# Patient Record
Sex: MALE | Race: WHITE | NOT HISPANIC OR LATINO
[De-identification: names, ages, dates, MRNs, and addresses within clinical notes are randomized per-mention and may not be internally consistent; named-entity substitution may affect disease eponyms.]

---

## 2018-04-04 ENCOUNTER — APPOINTMENT (OUTPATIENT)
Dept: UROLOGY | Facility: CLINIC | Age: 28
End: 2018-04-04
Payer: COMMERCIAL

## 2018-04-04 VITALS
SYSTOLIC BLOOD PRESSURE: 108 MMHG | TEMPERATURE: 98.6 F | HEART RATE: 87 BPM | WEIGHT: 165 LBS | OXYGEN SATURATION: 96 % | DIASTOLIC BLOOD PRESSURE: 71 MMHG | HEIGHT: 72 IN | BODY MASS INDEX: 22.35 KG/M2

## 2018-04-04 DIAGNOSIS — Z80.52 FAMILY HISTORY OF MALIGNANT NEOPLASM OF BLADDER: ICD-10-CM

## 2018-04-04 DIAGNOSIS — Z87.442 PERSONAL HISTORY OF URINARY CALCULI: ICD-10-CM

## 2018-04-04 PROCEDURE — 99214 OFFICE O/P EST MOD 30 MIN: CPT

## 2018-04-05 LAB
APPEARANCE: CLEAR
BACTERIA: NEGATIVE
BILIRUBIN URINE: NEGATIVE
BLOOD URINE: ABNORMAL
COLOR: YELLOW
GLUCOSE QUALITATIVE U: NEGATIVE MG/DL
KETONES URINE: NEGATIVE
LEUKOCYTE ESTERASE URINE: NEGATIVE
MICROSCOPIC-UA: NORMAL
NITRITE URINE: NEGATIVE
PH URINE: 7
PROTEIN URINE: NEGATIVE MG/DL
RED BLOOD CELLS URINE: 15 /HPF
SPECIFIC GRAVITY URINE: 1.01
SQUAMOUS EPITHELIAL CELLS: 0 /HPF
UROBILINOGEN URINE: NEGATIVE MG/DL
WHITE BLOOD CELLS URINE: 0 /HPF

## 2018-04-09 LAB — BACTERIA UR CULT: NORMAL

## 2018-04-10 ENCOUNTER — FORM ENCOUNTER (OUTPATIENT)
Age: 28
End: 2018-04-10

## 2018-04-11 ENCOUNTER — OUTPATIENT (OUTPATIENT)
Dept: OUTPATIENT SERVICES | Facility: HOSPITAL | Age: 28
LOS: 1 days | End: 2018-04-11
Payer: COMMERCIAL

## 2018-04-11 ENCOUNTER — APPOINTMENT (OUTPATIENT)
Dept: CT IMAGING | Facility: CLINIC | Age: 28
End: 2018-04-11
Payer: COMMERCIAL

## 2018-04-11 DIAGNOSIS — N20.0 CALCULUS OF KIDNEY: ICD-10-CM

## 2018-04-11 DIAGNOSIS — S82.409A UNSPECIFIED FRACTURE OF SHAFT OF UNSPECIFIED FIBULA, INITIAL ENCOUNTER FOR CLOSED FRACTURE: Chronic | ICD-10-CM

## 2018-04-11 PROCEDURE — 74176 CT ABD & PELVIS W/O CONTRAST: CPT

## 2018-04-11 PROCEDURE — 74018 RADEX ABDOMEN 1 VIEW: CPT | Mod: 26

## 2018-04-11 PROCEDURE — 74018 RADEX ABDOMEN 1 VIEW: CPT

## 2018-04-11 PROCEDURE — 74176 CT ABD & PELVIS W/O CONTRAST: CPT | Mod: 26

## 2018-04-12 ENCOUNTER — APPOINTMENT (OUTPATIENT)
Dept: UROLOGY | Facility: CLINIC | Age: 28
End: 2018-04-12
Payer: COMMERCIAL

## 2018-04-12 PROCEDURE — 99214 OFFICE O/P EST MOD 30 MIN: CPT

## 2018-05-25 ENCOUNTER — APPOINTMENT (OUTPATIENT)
Dept: UROLOGY | Facility: CLINIC | Age: 28
End: 2018-05-25
Payer: COMMERCIAL

## 2018-05-25 VITALS
HEIGHT: 72 IN | BODY MASS INDEX: 22.35 KG/M2 | RESPIRATION RATE: 16 BRPM | DIASTOLIC BLOOD PRESSURE: 74 MMHG | OXYGEN SATURATION: 96 % | WEIGHT: 165 LBS | HEART RATE: 88 BPM | TEMPERATURE: 98.2 F | SYSTOLIC BLOOD PRESSURE: 110 MMHG

## 2018-05-25 PROCEDURE — 99213 OFFICE O/P EST LOW 20 MIN: CPT

## 2018-06-01 LAB — BACTERIA UR CULT: NORMAL

## 2018-06-18 ENCOUNTER — APPOINTMENT (OUTPATIENT)
Dept: UROLOGY | Facility: CLINIC | Age: 28
End: 2018-06-18

## 2018-06-21 ENCOUNTER — APPOINTMENT (OUTPATIENT)
Dept: UROLOGY | Facility: HOSPITAL | Age: 28
End: 2018-06-21

## 2018-07-30 ENCOUNTER — OUTPATIENT (OUTPATIENT)
Dept: OUTPATIENT SERVICES | Facility: HOSPITAL | Age: 28
LOS: 1 days | End: 2018-07-30
Payer: COMMERCIAL

## 2018-07-30 VITALS
TEMPERATURE: 99 F | SYSTOLIC BLOOD PRESSURE: 118 MMHG | RESPIRATION RATE: 15 BRPM | OXYGEN SATURATION: 98 % | HEART RATE: 69 BPM | DIASTOLIC BLOOD PRESSURE: 77 MMHG | WEIGHT: 161.38 LBS | HEIGHT: 72 IN

## 2018-07-30 DIAGNOSIS — N20.0 CALCULUS OF KIDNEY: ICD-10-CM

## 2018-07-30 DIAGNOSIS — Z98.890 OTHER SPECIFIED POSTPROCEDURAL STATES: Chronic | ICD-10-CM

## 2018-07-30 DIAGNOSIS — S82.409A UNSPECIFIED FRACTURE OF SHAFT OF UNSPECIFIED FIBULA, INITIAL ENCOUNTER FOR CLOSED FRACTURE: Chronic | ICD-10-CM

## 2018-07-30 DIAGNOSIS — Z01.818 ENCOUNTER FOR OTHER PREPROCEDURAL EXAMINATION: ICD-10-CM

## 2018-07-30 LAB
ANION GAP SERPL CALC-SCNC: 11 MMOL/L — SIGNIFICANT CHANGE UP (ref 5–17)
BUN SERPL-MCNC: 14 MG/DL — SIGNIFICANT CHANGE UP (ref 7–23)
CALCIUM SERPL-MCNC: 9.2 MG/DL — SIGNIFICANT CHANGE UP (ref 8.4–10.5)
CHLORIDE SERPL-SCNC: 101 MMOL/L — SIGNIFICANT CHANGE UP (ref 96–108)
CO2 SERPL-SCNC: 27 MMOL/L — SIGNIFICANT CHANGE UP (ref 22–31)
CREAT SERPL-MCNC: 1.14 MG/DL — SIGNIFICANT CHANGE UP (ref 0.5–1.3)
GLUCOSE SERPL-MCNC: 90 MG/DL — SIGNIFICANT CHANGE UP (ref 70–99)
POTASSIUM SERPL-MCNC: 4.1 MMOL/L — SIGNIFICANT CHANGE UP (ref 3.5–5.3)
POTASSIUM SERPL-SCNC: 4.1 MMOL/L — SIGNIFICANT CHANGE UP (ref 3.5–5.3)
SODIUM SERPL-SCNC: 139 MMOL/L — SIGNIFICANT CHANGE UP (ref 135–145)

## 2018-07-30 PROCEDURE — G0463: CPT

## 2018-07-30 PROCEDURE — 85027 COMPLETE CBC AUTOMATED: CPT

## 2018-07-30 PROCEDURE — 87086 URINE CULTURE/COLONY COUNT: CPT

## 2018-07-30 PROCEDURE — 80048 BASIC METABOLIC PNL TOTAL CA: CPT

## 2018-07-30 RX ORDER — SODIUM CHLORIDE 9 MG/ML
3 INJECTION INTRAMUSCULAR; INTRAVENOUS; SUBCUTANEOUS EVERY 8 HOURS
Qty: 0 | Refills: 0 | Status: DISCONTINUED | OUTPATIENT
Start: 2018-08-03 | End: 2018-08-03

## 2018-07-30 RX ORDER — CIPROFLOXACIN LACTATE 400MG/40ML
400 VIAL (ML) INTRAVENOUS ONCE
Qty: 0 | Refills: 0 | Status: DISCONTINUED | OUTPATIENT
Start: 2018-08-03 | End: 2018-08-18

## 2018-07-30 RX ORDER — LIDOCAINE HCL 20 MG/ML
0.2 VIAL (ML) INJECTION ONCE
Qty: 0 | Refills: 0 | Status: DISCONTINUED | OUTPATIENT
Start: 2018-08-03 | End: 2018-08-03

## 2018-07-30 NOTE — H&P PST ADULT - NSANTHOSAYNRD_GEN_A_CORE
No. AUDREY screening performed.  STOP BANG Legend: 0-2 = LOW Risk; 3-4 = INTERMEDIATE Risk; 5-8 = HIGH Risk

## 2018-07-30 NOTE — H&P PST ADULT - HISTORY OF PRESENT ILLNESS
24 yr old male with trace blood in urinalysis in routine physical- started having left flank pain a week later- kidney stone. Now here for Left ESWL on 3/6/15. 26 yo male with h/o childhood asthma (no current medications), past h/o renal calculus s/p ESWL 3/2015 with acute onset of intermittent left flank pain. Imaging revealed 9x 5 mm non obstructing stone in left kidney lower pole. Pt is scheduled for left ESWL on 8/3/18. 26 yo male with h/o childhood asthma (no current medications), past h/o renal calculus s/p left ESWL 3/2015 with acute onset of intermittent left flank pain. Imaging revealed 9x 5 mm non obstructing stone in left kidney lower pole. Pt is scheduled for left ESWL on 8/3/18.

## 2018-07-30 NOTE — H&P PST ADULT - VISION (WITH CORRECTIVE LENSES IF THE PATIENT USUALLY WEARS THEM):
Partially impaired: cannot see medication labels or newsprint, but can see obstacles in path, and the surrounding layout; can count fingers at arm's length/glasses/contacts

## 2018-07-30 NOTE — H&P PST ADULT - PSH
Closed fracture of fibula  s/p ORIF- LEFT Closed fracture of fibula  s/p ORIF- LEFT  H/O lithotripsy  ESWL 3/2015 Closed fracture of fibula  s/p ORIF- LEFT  H/O lithotripsy  Left ESWL 3/2015

## 2018-07-31 LAB
CULTURE RESULTS: NO GROWTH — SIGNIFICANT CHANGE UP
HCT VFR BLD CALC: 43 % — SIGNIFICANT CHANGE UP (ref 39–50)
HGB BLD-MCNC: 14.4 G/DL — SIGNIFICANT CHANGE UP (ref 13–17)
MCHC RBC-ENTMCNC: 29.1 PG — SIGNIFICANT CHANGE UP (ref 27–34)
MCHC RBC-ENTMCNC: 33.5 GM/DL — SIGNIFICANT CHANGE UP (ref 32–36)
MCV RBC AUTO: 87 FL — SIGNIFICANT CHANGE UP (ref 80–100)
PLATELET # BLD AUTO: 309 K/UL — SIGNIFICANT CHANGE UP (ref 150–400)
RBC # BLD: 4.94 M/UL — SIGNIFICANT CHANGE UP (ref 4.2–5.8)
RBC # FLD: 13.1 % — SIGNIFICANT CHANGE UP (ref 10.3–14.5)
SPECIMEN SOURCE: SIGNIFICANT CHANGE UP
WBC # BLD: 7.35 K/UL — SIGNIFICANT CHANGE UP (ref 3.8–10.5)
WBC # FLD AUTO: 7.35 K/UL — SIGNIFICANT CHANGE UP (ref 3.8–10.5)

## 2018-08-02 ENCOUNTER — FORM ENCOUNTER (OUTPATIENT)
Age: 28
End: 2018-08-02

## 2018-08-03 ENCOUNTER — APPOINTMENT (OUTPATIENT)
Dept: UROLOGY | Facility: HOSPITAL | Age: 28
End: 2018-08-03

## 2018-08-03 ENCOUNTER — OUTPATIENT (OUTPATIENT)
Dept: OUTPATIENT SERVICES | Facility: HOSPITAL | Age: 28
LOS: 1 days | End: 2018-08-03
Payer: COMMERCIAL

## 2018-08-03 VITALS
OXYGEN SATURATION: 100 % | HEART RATE: 77 BPM | DIASTOLIC BLOOD PRESSURE: 66 MMHG | SYSTOLIC BLOOD PRESSURE: 110 MMHG | RESPIRATION RATE: 16 BRPM | TEMPERATURE: 98 F

## 2018-08-03 VITALS
HEART RATE: 72 BPM | HEIGHT: 72 IN | RESPIRATION RATE: 20 BRPM | DIASTOLIC BLOOD PRESSURE: 77 MMHG | OXYGEN SATURATION: 98 % | SYSTOLIC BLOOD PRESSURE: 113 MMHG | WEIGHT: 161.38 LBS | TEMPERATURE: 98 F

## 2018-08-03 DIAGNOSIS — N20.0 CALCULUS OF KIDNEY: ICD-10-CM

## 2018-08-03 DIAGNOSIS — Z98.890 OTHER SPECIFIED POSTPROCEDURAL STATES: Chronic | ICD-10-CM

## 2018-08-03 DIAGNOSIS — S82.409A UNSPECIFIED FRACTURE OF SHAFT OF UNSPECIFIED FIBULA, INITIAL ENCOUNTER FOR CLOSED FRACTURE: Chronic | ICD-10-CM

## 2018-08-03 PROCEDURE — 74018 RADEX ABDOMEN 1 VIEW: CPT | Mod: 26

## 2018-08-03 PROCEDURE — 76000 FLUOROSCOPY <1 HR PHYS/QHP: CPT

## 2018-08-03 PROCEDURE — 50590 FRAGMENTING OF KIDNEY STONE: CPT | Mod: LT

## 2018-08-03 PROCEDURE — 50590 FRAGMENTING OF KIDNEY STONE: CPT

## 2018-08-03 PROCEDURE — 74018 RADEX ABDOMEN 1 VIEW: CPT

## 2018-08-03 RX ORDER — TAMSULOSIN HYDROCHLORIDE 0.4 MG/1
1 CAPSULE ORAL
Qty: 30 | Refills: 0 | OUTPATIENT
Start: 2018-08-03 | End: 2018-09-01

## 2018-08-03 RX ORDER — METOCLOPRAMIDE HCL 10 MG
10 TABLET ORAL ONCE
Qty: 0 | Refills: 0 | Status: DISCONTINUED | OUTPATIENT
Start: 2018-08-03 | End: 2018-08-03

## 2018-08-03 RX ORDER — ONDANSETRON 8 MG/1
4 TABLET, FILM COATED ORAL EVERY 4 HOURS
Qty: 0 | Refills: 0 | Status: DISCONTINUED | OUTPATIENT
Start: 2018-08-03 | End: 2018-08-03

## 2018-08-03 RX ORDER — SODIUM CHLORIDE 9 MG/ML
1000 INJECTION, SOLUTION INTRAVENOUS
Qty: 0 | Refills: 0 | Status: DISCONTINUED | OUTPATIENT
Start: 2018-08-03 | End: 2018-08-18

## 2018-08-03 RX ADMIN — SODIUM CHLORIDE 3 MILLILITER(S): 9 INJECTION INTRAMUSCULAR; INTRAVENOUS; SUBCUTANEOUS at 06:57

## 2018-08-03 NOTE — ASU DISCHARGE PLAN (ADULT/PEDIATRIC). - MEDICATION SUMMARY - MEDICATIONS TO TAKE
I will START or STAY ON the medications listed below when I get home from the hospital:    Percocet 5/325 oral tablet  -- 1 tab(s) by mouth every 6 hours, As Needed -for moderate pain - for severe pain MDD:4 tabs   -- Caution federal law prohibits the transfer of this drug to any person other  than the person for whom it was prescribed.  May cause drowsiness.  Alcohol may intensify this effect.  Use care when operating dangerous machinery.  This prescription cannot be refilled.  This product contains acetaminophen.  Do not use  with any other product containing acetaminophen to prevent possible liver damage.  Using more of this medication than prescribed may cause serious breathing problems.    -- Indication: For pain    Flomax 0.4 mg oral capsule  -- 1 cap(s) by mouth once a day (at bedtime)  - pt currently not taking   -- Indication: For passage of stones

## 2018-08-03 NOTE — ASU DISCHARGE PLAN (ADULT/PEDIATRIC). - NOTIFY
Persistent Nausea and Vomiting/Unable to Urinate/Fever greater than 101/Inability to Tolerate Liquids or Foods

## 2018-08-03 NOTE — ASU DISCHARGE PLAN (ADULT/PEDIATRIC). - INSTRUCTIONS
Be sure to drink plenty of fluids.  Please strain your urine and brig stone fragments to your next visit. Please take Flomax daily to facilitate stone fragment passage

## 2018-08-03 NOTE — BRIEF OPERATIVE NOTE - PROCEDURE
<<-----Click on this checkbox to enter Procedure ESWL (extracorporeal shockwave lithotripsy)  08/03/2018    Active  [id number]

## 2018-08-04 ENCOUNTER — CLINICAL ADVICE (OUTPATIENT)
Age: 28
End: 2018-08-04

## 2018-08-06 ENCOUNTER — FORM ENCOUNTER (OUTPATIENT)
Age: 28
End: 2018-08-06

## 2018-08-07 ENCOUNTER — OUTPATIENT (OUTPATIENT)
Dept: OUTPATIENT SERVICES | Facility: HOSPITAL | Age: 28
LOS: 1 days | End: 2018-08-07
Payer: COMMERCIAL

## 2018-08-07 ENCOUNTER — APPOINTMENT (OUTPATIENT)
Dept: CT IMAGING | Facility: IMAGING CENTER | Age: 28
End: 2018-08-07
Payer: COMMERCIAL

## 2018-08-07 ENCOUNTER — FORM ENCOUNTER (OUTPATIENT)
Age: 28
End: 2018-08-07

## 2018-08-07 ENCOUNTER — APPOINTMENT (OUTPATIENT)
Dept: UROLOGY | Facility: CLINIC | Age: 28
End: 2018-08-07
Payer: COMMERCIAL

## 2018-08-07 DIAGNOSIS — Z98.890 OTHER SPECIFIED POSTPROCEDURAL STATES: Chronic | ICD-10-CM

## 2018-08-07 DIAGNOSIS — S82.409A UNSPECIFIED FRACTURE OF SHAFT OF UNSPECIFIED FIBULA, INITIAL ENCOUNTER FOR CLOSED FRACTURE: Chronic | ICD-10-CM

## 2018-08-07 DIAGNOSIS — N20.0 CALCULUS OF KIDNEY: ICD-10-CM

## 2018-08-07 PROBLEM — J45.909 UNSPECIFIED ASTHMA, UNCOMPLICATED: Chronic | Status: ACTIVE | Noted: 2018-07-30

## 2018-08-07 PROCEDURE — 74176 CT ABD & PELVIS W/O CONTRAST: CPT

## 2018-08-07 PROCEDURE — 99024 POSTOP FOLLOW-UP VISIT: CPT

## 2018-08-07 PROCEDURE — 74176 CT ABD & PELVIS W/O CONTRAST: CPT | Mod: 26

## 2018-08-08 ENCOUNTER — APPOINTMENT (OUTPATIENT)
Dept: UROLOGY | Facility: CLINIC | Age: 28
End: 2018-08-08
Payer: COMMERCIAL

## 2018-08-08 ENCOUNTER — OUTPATIENT (OUTPATIENT)
Dept: OUTPATIENT SERVICES | Facility: HOSPITAL | Age: 28
LOS: 1 days | End: 2018-08-08
Payer: COMMERCIAL

## 2018-08-08 ENCOUNTER — APPOINTMENT (OUTPATIENT)
Dept: RADIOLOGY | Facility: IMAGING CENTER | Age: 28
End: 2018-08-08
Payer: COMMERCIAL

## 2018-08-08 DIAGNOSIS — N20.0 CALCULUS OF KIDNEY: ICD-10-CM

## 2018-08-08 DIAGNOSIS — Z98.890 OTHER SPECIFIED POSTPROCEDURAL STATES: Chronic | ICD-10-CM

## 2018-08-08 DIAGNOSIS — S82.409A UNSPECIFIED FRACTURE OF SHAFT OF UNSPECIFIED FIBULA, INITIAL ENCOUNTER FOR CLOSED FRACTURE: Chronic | ICD-10-CM

## 2018-08-08 PROCEDURE — 74018 RADEX ABDOMEN 1 VIEW: CPT

## 2018-08-08 PROCEDURE — 99024 POSTOP FOLLOW-UP VISIT: CPT

## 2018-08-08 PROCEDURE — 74018 RADEX ABDOMEN 1 VIEW: CPT | Mod: 26

## 2018-08-09 LAB — BACTERIA UR CULT: NORMAL

## 2018-11-07 ENCOUNTER — APPOINTMENT (OUTPATIENT)
Dept: ULTRASOUND IMAGING | Facility: IMAGING CENTER | Age: 28
End: 2018-11-07

## 2018-11-09 ENCOUNTER — APPOINTMENT (OUTPATIENT)
Dept: UROLOGY | Facility: CLINIC | Age: 28
End: 2018-11-09

## 2019-12-26 ENCOUNTER — CLINICAL ADVICE (OUTPATIENT)
Age: 29
End: 2019-12-26

## 2022-03-30 ENCOUNTER — APPOINTMENT (OUTPATIENT)
Dept: ORTHOPEDIC SURGERY | Facility: CLINIC | Age: 32
End: 2022-03-30
Payer: COMMERCIAL

## 2022-03-30 ENCOUNTER — NON-APPOINTMENT (OUTPATIENT)
Age: 32
End: 2022-03-30

## 2022-03-30 DIAGNOSIS — M25.511 PAIN IN RIGHT SHOULDER: ICD-10-CM

## 2022-03-30 PROCEDURE — 73030 X-RAY EXAM OF SHOULDER: CPT | Mod: RT

## 2022-03-30 PROCEDURE — 99203 OFFICE O/P NEW LOW 30 MIN: CPT

## 2022-03-30 NOTE — DISCUSSION/SUMMARY
[de-identified] : 32 y/o male with right shoulder pain. \par \par Patient presents with acute condition to the right shoulder. Clinically, patient has positive posterior shoulder pain with [place]'s testing. I discussed that the most likely source of the discomfort is the glenoid labrum or bicipital complex and can be a result of traumatic injury and instability versus chronic degeneration from overhead activity and overuse. Labral injury can cause symptoms of instability and pain. \par \par We discussed short-term and long-term outcomes as well as the goal of treatment to reduce pain and restore function. Nonsurgical treatment is typically first-line therapy that may take weeks to months to resolve symptoms; includes rest from overhead activities, NSAIDs, home exercise program versus physical therapy to restore normal strength/ROM/function of the shoulder. We also discussed the role of arthroscopic surgical intervention when nonsurgical treatment does not adequately relieve pain/inflammation/instability.\par \par MRI Rx given to patient to further delineate any internal structural derrangement to the shoulder. This will allow for better understanding of the severity of disease, and allow for better stratification of treatment strategies (surgical vs. non-surgical). Patient is agreeable to further imaging.\par \par Recommendations: Conservative modalities including overhead activity rest/avoidance, ice, NSAIDs, strengthening and stretching program.\par \par Followup: After MRI.

## 2022-03-30 NOTE — HISTORY OF PRESENT ILLNESS
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 He was playing basketball and someone ran into him from behind and felt like the shoulder shifted. The pain is brought on with extreme FF, Push ups and laying on it. Localizes pain to the back of the shoulder. Denies instability. He is not taking pain medication. Denies numbness or tingling. Patient is active and has continued playing basketball and going to the gym.\par \par The patient's past medical history, past surgical history, medications and allergies were reviewed by me today with the patient and documented accordingly. In addition, the patient's family and social history, which were noncontributory to this visit, were reviewed also.

## 2022-03-30 NOTE — PHYSICAL EXAM
[de-identified] : Oriented to time, place, person\par Mood: Normal\par Affect: Normal\par Appearance: Healthy, well appearing, no acute distress.\par Gait: Normal\par Assistive Devices: None\par \par Right shoulder exam:\par \par Inspection: No malalignment, No defects, No atrophy\par Skin: No masses, No lesions\par Neck: Negative Spurling, full ROM, no pain with ROM\par AROM: FF to 180, abduction to 90, ER to 90, IR to upper lumbar\par Painful arc ROM: Mild pain with FF\par Tenderness: no bicipital tenderness, no tenderness to greater tuberosity/RTC insertion, no anterior shoulder/lesser tuberosity tenderness\par Strength: 5/5 ER, 5/5 IR in adduction, 5/5 supraspinatus testing, + [place]'s test with posterior shoulder pain. \par AC joint: No TTP/pain with cross arm testing\par Biceps: Speed Negative, Yergason Negative \par Impingement test: +Clement, + Neer\par Vasc: 2+ radial pulse \par Stability: Stable \par Neuro: AIN, PIN, Ulnar nerve intact to motor\par Sensation: Intact to light touch throughout  [de-identified] : The following radiographs were ordered and read by me during this patients visit. I reviewed each radiograph in detail with the patient and discussed the findings as highlighted below.\par \par 3 views of right shoulder were obtained today, 03/30/2022, that show no acute fracture or dislocation. There is no glenohumeral and no AC joint degenerative change seen. Type II acromion. There is no significant malalignment. No significant other obvious osseous abnormality, otherwise unremarkable.

## 2022-03-30 NOTE — ADDENDUM
[FreeTextEntry1] : This note was written by Consuelo Mendoza on 03/30/2022 acting solely as a scribe for Dr. Ricardo Sims.\par \par All medical record entries made by the Scribe were at my, Dr. Ricardo Sims, direction and personally dictated by me on 03/30/2022. I have personally reviewed the chart and agree that the record accurately reflects my personal performance of the history, physical exam, assessment and plan.

## 2022-04-12 PROBLEM — Z00.00 ENCOUNTER FOR PREVENTIVE HEALTH EXAMINATION: Status: ACTIVE | Noted: 2022-04-12

## 2022-04-16 ENCOUNTER — OUTPATIENT (OUTPATIENT)
Dept: OUTPATIENT SERVICES | Facility: HOSPITAL | Age: 32
LOS: 1 days | End: 2022-04-16
Payer: COMMERCIAL

## 2022-04-16 ENCOUNTER — APPOINTMENT (OUTPATIENT)
Dept: MRI IMAGING | Facility: CLINIC | Age: 32
End: 2022-04-16

## 2022-04-16 ENCOUNTER — RESULT REVIEW (OUTPATIENT)
Age: 32
End: 2022-04-16

## 2022-04-16 ENCOUNTER — APPOINTMENT (OUTPATIENT)
Dept: MRI IMAGING | Facility: CLINIC | Age: 32
End: 2022-04-16
Payer: COMMERCIAL

## 2022-04-16 DIAGNOSIS — M25.511 PAIN IN RIGHT SHOULDER: ICD-10-CM

## 2022-04-16 DIAGNOSIS — Z98.890 OTHER SPECIFIED POSTPROCEDURAL STATES: Chronic | ICD-10-CM

## 2022-04-16 DIAGNOSIS — S82.409A UNSPECIFIED FRACTURE OF SHAFT OF UNSPECIFIED FIBULA, INITIAL ENCOUNTER FOR CLOSED FRACTURE: Chronic | ICD-10-CM

## 2022-04-16 PROCEDURE — 73221 MRI JOINT UPR EXTREM W/O DYE: CPT

## 2022-04-16 PROCEDURE — 73221 MRI JOINT UPR EXTREM W/O DYE: CPT | Mod: 26,RT

## 2022-04-28 ENCOUNTER — NON-APPOINTMENT (OUTPATIENT)
Age: 32
End: 2022-04-28

## 2023-08-30 ENCOUNTER — APPOINTMENT (OUTPATIENT)
Dept: ORTHOPEDIC SURGERY | Facility: CLINIC | Age: 33
End: 2023-08-30
Payer: COMMERCIAL

## 2023-08-30 VITALS
BODY MASS INDEX: 23.3 KG/M2 | HEART RATE: 76 BPM | SYSTOLIC BLOOD PRESSURE: 115 MMHG | DIASTOLIC BLOOD PRESSURE: 72 MMHG | WEIGHT: 172 LBS | HEIGHT: 72 IN

## 2023-08-30 DIAGNOSIS — M25.561 PAIN IN RIGHT KNEE: ICD-10-CM

## 2023-08-30 PROCEDURE — 73562 X-RAY EXAM OF KNEE 3: CPT | Mod: RT

## 2023-08-30 PROCEDURE — 99214 OFFICE O/P EST MOD 30 MIN: CPT

## 2023-08-30 RX ORDER — NAPROXEN 500 MG/1
500 TABLET ORAL
Qty: 20 | Refills: 0 | Status: ACTIVE | COMMUNITY
Start: 2023-08-30 | End: 1900-01-01

## 2023-08-30 NOTE — HISTORY OF PRESENT ILLNESS
[de-identified] : 31 y/o M presents with 1 month of worsening R knee pain. Pt does not recall trauma or injury. States pain started off as achy, but has progressed in nature and intensity to a 6/10 while putting pressure on it. He complains of clicking with extension of knee. He complains of his knee feeling weak. Pt is not able to work-out lower extremity or run. Pt denies medications for pain.  Pt denies numbness or tingling.  The patient's past medical history, past surgical history, medications and allergies were reviewed by me today with the patient and documented accordingly. In addition, the patient's family and social history, which were noncontributory to this visit, were reviewed also.

## 2023-08-30 NOTE — DISCUSSION/SUMMARY
[de-identified] : 32-year-old male with right knee pain  Patient presents with right knee pain consistent with an overuse injury.  There is no significant internal derangement suspected on clinical evaluation, but there does appear to be some mild residual inflammation present.  Patient complains of some discomfort peripatellar, as well as some discomfort in high flexion consistent with overuse process.  Recommendations made for conservative management prior to consideration of imaging as there was no traumatic event.  Recommendation: Trial prescribed NSAIDs x7 to 10 days.  Ice/rest as indicated.  Home exercise program provided.  Follow-up if no improvement for possible consideration MRI.

## 2023-08-30 NOTE — PHYSICAL EXAM
[de-identified] : Right knee exam  Skin: Clean, dry, intact Inspection: No obvious malalignment, no masses, mild swelling, no effusion Pulses: 2+ DP/PT pulses ROM: 0-135 degrees of flexion.  Mild pain with deep knee flexion/extension. Tenderness: No MJLT. No LJLT. No pain over the patella facets. No pain to the quadriceps tendon. No pain to the patella tendon. No posterior knee tenderness. Stability: Stable to varus, valgus. Negative lachman testing. Negative anterior drawer, negative posterior drawer. Strength: 5/5 Q/H/TA/GS/EHL, without atrophy Neuro: In tact to light touch throughout, DTR's normal Additional tests: Negative McMurrays test, Negative patellar grind test.   [de-identified] :  The following radiographs were ordered and read by me during this patients visit. I reviewed each radiograph in detail with the patient and discussed the findings as highlighted below.   3 views of the right knee were obtained today that show no acute fracture or dislocation. There is no degenerative change seen. There is no significant malalignment. No significant other obvious osseous abnormality, otherwise unremarkable.

## 2023-10-30 ENCOUNTER — APPOINTMENT (OUTPATIENT)
Dept: UROLOGY | Facility: CLINIC | Age: 33
End: 2023-10-30
Payer: COMMERCIAL

## 2023-10-30 VITALS
SYSTOLIC BLOOD PRESSURE: 127 MMHG | HEIGHT: 72 IN | BODY MASS INDEX: 23.03 KG/M2 | WEIGHT: 170 LBS | TEMPERATURE: 97.2 F | DIASTOLIC BLOOD PRESSURE: 82 MMHG | RESPIRATION RATE: 16 BRPM | HEART RATE: 78 BPM

## 2023-10-30 PROCEDURE — 99203 OFFICE O/P NEW LOW 30 MIN: CPT

## 2023-10-30 RX ORDER — DOXYCYCLINE HYCLATE 100 MG/1
100 TABLET ORAL TWICE DAILY
Qty: 28 | Refills: 0 | Status: ACTIVE | COMMUNITY
Start: 2023-10-30 | End: 1900-01-01

## 2023-10-30 RX ORDER — TAMSULOSIN HYDROCHLORIDE 0.4 MG/1
0.4 CAPSULE ORAL
Qty: 30 | Refills: 2 | Status: COMPLETED | COMMUNITY
Start: 2018-04-12 | End: 2023-10-30

## 2023-10-31 LAB
APPEARANCE: CLEAR
BILIRUBIN URINE: NEGATIVE
BLOOD URINE: NEGATIVE
COLOR: YELLOW
GLUCOSE QUALITATIVE U: NEGATIVE MG/DL
KETONES URINE: NEGATIVE MG/DL
LEUKOCYTE ESTERASE URINE: NEGATIVE
NITRITE URINE: NEGATIVE
PH URINE: 6
PROTEIN URINE: NEGATIVE MG/DL
SPECIFIC GRAVITY URINE: 1.02
UROBILINOGEN URINE: 0.2 MG/DL

## 2023-11-02 LAB
BACTERIA UR CULT: NORMAL
C TRACH RRNA SPEC QL NAA+PROBE: NOT DETECTED
M GENITALIUM DNA UR QL NAA+PROBE: NEGATIVE
M HOMINIS DNA SPEC QL NAA+PROBE: NEGATIVE
N GONORRHOEA RRNA SPEC QL NAA+PROBE: NOT DETECTED
SOURCE AMPLIFICATION: NORMAL
UREAPLASMA DNA SPEC QL NAA+PROBE: POSITIVE

## 2023-11-07 ENCOUNTER — TRANSCRIPTION ENCOUNTER (OUTPATIENT)
Age: 33
End: 2023-11-07

## 2023-11-09 ENCOUNTER — TRANSCRIPTION ENCOUNTER (OUTPATIENT)
Age: 33
End: 2023-11-09

## 2023-11-10 ENCOUNTER — TRANSCRIPTION ENCOUNTER (OUTPATIENT)
Age: 33
End: 2023-11-10

## 2023-11-17 ENCOUNTER — TRANSCRIPTION ENCOUNTER (OUTPATIENT)
Age: 33
End: 2023-11-17

## 2023-11-20 ENCOUNTER — TRANSCRIPTION ENCOUNTER (OUTPATIENT)
Age: 33
End: 2023-11-20

## 2023-11-21 ENCOUNTER — TRANSCRIPTION ENCOUNTER (OUTPATIENT)
Age: 33
End: 2023-11-21

## 2023-11-22 ENCOUNTER — APPOINTMENT (OUTPATIENT)
Dept: UROLOGY | Facility: CLINIC | Age: 33
End: 2023-11-22
Payer: COMMERCIAL

## 2023-11-22 VITALS
HEIGHT: 72 IN | RESPIRATION RATE: 17 BRPM | WEIGHT: 170 LBS | TEMPERATURE: 98.7 F | BODY MASS INDEX: 23.03 KG/M2 | SYSTOLIC BLOOD PRESSURE: 130 MMHG | HEART RATE: 83 BPM | DIASTOLIC BLOOD PRESSURE: 75 MMHG

## 2023-11-22 DIAGNOSIS — Z87.448 PERSONAL HISTORY OF OTHER DISEASES OF URINARY SYSTEM: ICD-10-CM

## 2023-11-22 PROCEDURE — 99214 OFFICE O/P EST MOD 30 MIN: CPT

## 2023-12-01 ENCOUNTER — APPOINTMENT (OUTPATIENT)
Dept: UROLOGY | Facility: CLINIC | Age: 33
End: 2023-12-01

## 2023-12-05 ENCOUNTER — TRANSCRIPTION ENCOUNTER (OUTPATIENT)
Age: 33
End: 2023-12-05

## 2023-12-12 ENCOUNTER — NON-APPOINTMENT (OUTPATIENT)
Age: 33
End: 2023-12-12

## 2024-01-05 ENCOUNTER — TRANSCRIPTION ENCOUNTER (OUTPATIENT)
Age: 34
End: 2024-01-05

## 2024-01-17 ENCOUNTER — APPOINTMENT (OUTPATIENT)
Dept: UROLOGY | Facility: CLINIC | Age: 34
End: 2024-01-17
Payer: COMMERCIAL

## 2024-01-17 VITALS
DIASTOLIC BLOOD PRESSURE: 78 MMHG | WEIGHT: 178 LBS | SYSTOLIC BLOOD PRESSURE: 131 MMHG | HEART RATE: 78 BPM | BODY MASS INDEX: 24.11 KG/M2 | TEMPERATURE: 98.2 F | HEIGHT: 72 IN | RESPIRATION RATE: 17 BRPM

## 2024-01-17 DIAGNOSIS — N20.0 CALCULUS OF KIDNEY: ICD-10-CM

## 2024-01-17 LAB
APPEARANCE: CLEAR
BACTERIA: NEGATIVE /HPF
BILIRUBIN URINE: NEGATIVE
BLOOD URINE: NEGATIVE
CAST: 0 /LPF
COLOR: YELLOW
EPITHELIAL CELLS: 1 /HPF
GLUCOSE QUALITATIVE U: NEGATIVE MG/DL
HAV IGM SER QL: NONREACTIVE
HBV CORE IGM SER QL: NONREACTIVE
HBV SURFACE AG SER QL: NONREACTIVE
HCV AB SER QL: NONREACTIVE
HCV S/CO RATIO: 0.08 S/CO
HIV1+2 AB SPEC QL IA.RAPID: NONREACTIVE
KETONES URINE: NEGATIVE MG/DL
LEUKOCYTE ESTERASE URINE: NEGATIVE
MICROSCOPIC-UA: NORMAL
NITRITE URINE: NEGATIVE
PH URINE: 6
PROTEIN URINE: NORMAL MG/DL
RED BLOOD CELLS URINE: 0 /HPF
SPECIFIC GRAVITY URINE: 1.03
UROBILINOGEN URINE: 0.2 MG/DL
WHITE BLOOD CELLS URINE: 0 /HPF

## 2024-01-17 PROCEDURE — 99215 OFFICE O/P EST HI 40 MIN: CPT

## 2024-01-17 NOTE — PHYSICAL EXAM
[General Appearance - Well Developed] : well developed [General Appearance - Well Nourished] : well nourished [Normal Appearance] : normal appearance [Well Groomed] : well groomed [General Appearance - In No Acute Distress] : no acute distress [Abdomen Soft] : soft [Abdomen Tenderness] : non-tender [Costovertebral Angle Tenderness] : no ~M costovertebral angle tenderness [Urethral Meatus] : meatus normal [Urinary Bladder Findings] : the bladder was normal on palpation [Scrotum] : the scrotum was normal [Epididymis] : the epididymides were normal [Testes Tenderness] : no tenderness of the testes [Testes Mass (___cm)] : there were no testicular masses [Skin Color & Pigmentation] : normal skin color and pigmentation [Edema] : no peripheral edema [] : no respiratory distress [Respiration, Rhythm And Depth] : normal respiratory rhythm and effort [Exaggerated Use Of Accessory Muscles For Inspiration] : no accessory muscle use [Oriented To Time, Place, And Person] : oriented to person, place, and time [Affect] : the affect was normal [Mood] : the mood was normal [Not Anxious] : not anxious [Normal Station and Gait] : the gait and station were normal for the patient's age [Inguinal Lymph Nodes Enlarged Bilaterally] : inguinal [Penis Abnormality] : normal circumcised penis

## 2024-01-17 NOTE — ASSESSMENT
[FreeTextEntry1] : Urethritis symptoms Reassured patient warm soaks prn ibuprofen prn comprehensive STI testing repeated  Kidney stones: no recurrent stones Maintain increased fluids intake

## 2024-01-17 NOTE — HISTORY OF PRESENT ILLNESS
[FreeTextEntry1] : Still having sensitivity No urethral discharge Maybe very mild dysuria No fever  Kidney stones: Renal sono Dec 2023: no stones, no hydronephrosis  LAST VISIT:  Patient is here for follow up He was seen and treated by Dr. Jose for urethritis Testing was positive only for Mycoplasma genitalium He was treated with a course of doxycycline 2 weeks and just completed a course of Zithromax x 5 days He started to feel better a few days after completing course of Zithromax and more so once he increased his fluids intake  He does not have any urethral discharge, no blood in urine Denies frequency or urgency  Hx of stones Last imaging was Nov 2018

## 2024-01-18 LAB
BACTERIA UR CULT: NORMAL
C TRACH RRNA SPEC QL NAA+PROBE: NOT DETECTED
N GONORRHOEA RRNA SPEC QL NAA+PROBE: NOT DETECTED
RPR SER-TITR: NORMAL
SOURCE AMPLIFICATION: NORMAL

## 2024-01-22 LAB
HSV 1 IGG TYPE-SPECIFIC AB: 14.4 INDEX
HSV 2 IGG TYPE-SPECIFIC AB: <0.9 INDEX

## 2024-01-24 LAB
M GENITALIUM DNA UR QL NAA+PROBE: NEGATIVE
M HOMINIS DNA SPEC QL NAA+PROBE: NEGATIVE
UREAPLASMA DNA SPEC QL NAA+PROBE: NEGATIVE

## 2024-01-31 ENCOUNTER — APPOINTMENT (OUTPATIENT)
Dept: UROLOGY | Facility: CLINIC | Age: 34
End: 2024-01-31

## 2024-04-30 ENCOUNTER — TRANSCRIPTION ENCOUNTER (OUTPATIENT)
Age: 34
End: 2024-04-30

## 2024-05-01 ENCOUNTER — APPOINTMENT (OUTPATIENT)
Dept: UROLOGY | Facility: CLINIC | Age: 34
End: 2024-05-01
Payer: COMMERCIAL

## 2024-05-01 DIAGNOSIS — N34.2 OTHER URETHRITIS: ICD-10-CM

## 2024-05-01 DIAGNOSIS — N39.0 URINARY TRACT INFECTION, SITE NOT SPECIFIED: ICD-10-CM

## 2024-05-01 DIAGNOSIS — R30.0 DYSURIA: ICD-10-CM

## 2024-05-01 PROCEDURE — 99443: CPT

## 2024-05-03 ENCOUNTER — TRANSCRIPTION ENCOUNTER (OUTPATIENT)
Age: 34
End: 2024-05-03

## 2024-05-06 ENCOUNTER — TRANSCRIPTION ENCOUNTER (OUTPATIENT)
Age: 34
End: 2024-05-06

## 2024-05-07 ENCOUNTER — TRANSCRIPTION ENCOUNTER (OUTPATIENT)
Age: 34
End: 2024-05-07

## 2024-06-02 NOTE — HISTORY OF PRESENT ILLNESS
[Home] : at home, [unfilled] , at the time of the visit. [Medical Office: ([facility])___] : at the medical office located in  [Verbal consent obtained from patient] : the patient, [unfilled] [FreeTextEntry1] : Telehealth appointment today  Patient reports that his symptoms of burning after voiding and irritative urethral symptoms are still present He noted over the last week that increase hydration has helped There is no urethral discharge No enlarged inguinal nodes He has avoided re-exposure since the initial episode  He is compliant with the regimen prescribed during his last visit: warm soaks prn ibuprofen prn  Plan Continue increased hydration for the next week STI testing reordered to be done if his symptoms do not resolve Consider adding tamsulosin for pelvic floor relaxation if all STI testing is negative

## 2024-06-03 LAB
APPEARANCE: CLEAR
BACTERIA UR CULT: NORMAL
BACTERIA: NEGATIVE /HPF
BILIRUBIN URINE: NEGATIVE
BLOOD URINE: NEGATIVE
C TRACH RRNA SPEC QL NAA+PROBE: NOT DETECTED
CAST: 0 /LPF
COLOR: YELLOW
EPITHELIAL CELLS: 0 /HPF
GLUCOSE QUALITATIVE U: NEGATIVE MG/DL
HAV IGM SER QL: NONREACTIVE
HBV CORE IGM SER QL: NONREACTIVE
HBV SURFACE AG SER QL: NONREACTIVE
HCV AB SER QL: NONREACTIVE
HCV S/CO RATIO: 0.07 S/CO
HIV1+2 AB SPEC QL IA.RAPID: NONREACTIVE
HSV 1 IGG TYPE-SPECIFIC AB: 12.9 INDEX
HSV 2 IGG TYPE-SPECIFIC AB: <0.9 INDEX
KETONES URINE: NEGATIVE MG/DL
LEUKOCYTE ESTERASE URINE: NEGATIVE
M GENITALIUM DNA UR QL NAA+PROBE: NEGATIVE
M HOMINIS DNA SPEC QL NAA+PROBE: NEGATIVE
MICROSCOPIC-UA: NORMAL
N GONORRHOEA RRNA SPEC QL NAA+PROBE: NOT DETECTED
NITRITE URINE: NEGATIVE
PH URINE: 8
PROTEIN URINE: NEGATIVE MG/DL
RED BLOOD CELLS URINE: 0 /HPF
SOURCE AMPLIFICATION: NORMAL
SPECIFIC GRAVITY URINE: 1.01
T PALLIDUM AB SER QL IA: NEGATIVE
UREAPLASMA DNA SPEC QL NAA+PROBE: NEGATIVE
URINE CYTOLOGY: NORMAL
UROBILINOGEN URINE: 0.2 MG/DL
WHITE BLOOD CELLS URINE: 0 /HPF

## 2024-07-01 ENCOUNTER — TRANSCRIPTION ENCOUNTER (OUTPATIENT)
Age: 34
End: 2024-07-01

## 2024-07-01 RX ORDER — TAMSULOSIN HYDROCHLORIDE 0.4 MG/1
0.4 CAPSULE ORAL
Qty: 90 | Refills: 3 | Status: ACTIVE | COMMUNITY
Start: 2024-07-01 | End: 1900-01-01

## 2024-07-24 ENCOUNTER — TRANSCRIPTION ENCOUNTER (OUTPATIENT)
Age: 34
End: 2024-07-24

## 2024-09-10 ENCOUNTER — TRANSCRIPTION ENCOUNTER (OUTPATIENT)
Age: 34
End: 2024-09-10

## 2024-09-10 ENCOUNTER — APPOINTMENT (OUTPATIENT)
Dept: OTOLARYNGOLOGY | Facility: CLINIC | Age: 34
End: 2024-09-10
Payer: COMMERCIAL

## 2024-09-10 VITALS
OXYGEN SATURATION: 97 % | HEIGHT: 72 IN | TEMPERATURE: 98.2 F | SYSTOLIC BLOOD PRESSURE: 119 MMHG | WEIGHT: 181 LBS | BODY MASS INDEX: 24.52 KG/M2 | HEART RATE: 102 BPM | DIASTOLIC BLOOD PRESSURE: 85 MMHG

## 2024-09-10 DIAGNOSIS — J03.90 ACUTE TONSILLITIS, UNSPECIFIED: ICD-10-CM

## 2024-09-10 DIAGNOSIS — R07.0 PAIN IN THROAT: ICD-10-CM

## 2024-09-10 DIAGNOSIS — H92.03 OTALGIA, BILATERAL: ICD-10-CM

## 2024-09-10 PROCEDURE — 99204 OFFICE O/P NEW MOD 45 MIN: CPT | Mod: 25

## 2024-09-10 PROCEDURE — 31575 DIAGNOSTIC LARYNGOSCOPY: CPT

## 2024-09-10 RX ORDER — METHYLPREDNISOLONE 4 MG/1
4 TABLET ORAL
Qty: 1 | Refills: 0 | Status: ACTIVE | COMMUNITY
Start: 2024-09-10 | End: 1900-01-01

## 2024-09-10 RX ORDER — CLINDAMYCIN HYDROCHLORIDE 300 MG/1
300 CAPSULE ORAL EVERY 6 HOURS
Qty: 28 | Refills: 0 | Status: ACTIVE | COMMUNITY
Start: 2024-09-10 | End: 1900-01-01

## 2024-09-10 RX ORDER — ALFUZOSIN HYDROCHLORIDE 10 MG/1
10 TABLET, EXTENDED RELEASE ORAL
Qty: 90 | Refills: 3 | Status: ACTIVE | COMMUNITY
Start: 2024-09-10 | End: 1900-01-01

## 2024-09-10 NOTE — ASSESSMENT
[FreeTextEntry1] : 33 year old male presents with 2-3 days of throat pain. On exam, exudate b/l consistent with tonsilitis, significant exudate on tonsils and along BOT seen on scope, no edema. no trismus, full ROM of the neck. - pt pending strep test  - will send course of clindamycin and Medrol - asymmetric swelling - worse on left, severe  Also with bilateral ear pain. Benign ear exam. will see if sx improve with treating tonsils

## 2024-09-10 NOTE — END OF VISIT
[FreeTextEntry3] : I personally saw and examined the patient in detail. I spoke to ISHA Gaitan regarding the assessment and plan of care.  I preformed the procedures and I reviewed the above assessment and plan of care, and agree. I have made changes in changes in the body of the note where appropriate.

## 2024-09-10 NOTE — HISTORY OF PRESENT ILLNESS
[de-identified] : 33 year old male presents with 2-3 days of severe throat pain. States had a 103 fever yesterday. States pain is 8/10 (10 being the worst pain hes ever felt) Got a strep test yesterday but waiting on results. States wokeup today and pain was worse. States swallowing feels like tasting glass. taking z eric. Has had severe throat infections in the past, states it was about a year ago. Denies previous PTA that he knows of. Able to open mouth.  Also with ear pain at both sides for last 1-2 days. States feels like pressure within ear canal. no Vertigo, tinnitus, drainage or facial weakness.

## 2024-09-10 NOTE — CONSULT LETTER
[FreeTextEntry1] : Dear Dr. VINITA SOLANO  I had the pleasure of evaluating your patient AMADA JARAMILLO, thank you for allowing us to participate in their care. please see full note detailing our visit below. If you have any questions, please do not hesitate to call me and I would be happy to discuss further.   Farhan Wyman M.D. Attending Physician,   Department of Otolaryngology - Head and Neck Surgery [facility]  Office: [phone] Fax: [phone]

## 2024-09-10 NOTE — PROCEDURE
[de-identified] : Laryngoscopy: Procedure performed: laryngeal Endoscopy- Diagnostic Pre-op/post op indication: dysphonia Verbal and/or written consent obtained from patient, Patient was unable to cooperate with mirror Scope #: 3, flexible fiber optic telescope used Scope was introduced through the nose passed on the floor of the nose to the nasopharynx and then followed down the soft palate to the lower pharynx. The tongue Base, Larynx, Hypopharynx were examined. Base of tongue was symmetric with exudate, vallecular was clear, epiglottis was not deformed, subglottis/ pyriform and posterior pharyngeal walls were clear. No erythema, edema, pooling of secretions, masses or lesions. Airway patent, no foreign body visualized. No glottic/supraglottic edema. True vocal cords, arytenoids, vestibular folds, ventricles, pyriform sinuses, and aryepiglottic folds appear normal bilaterally. Vocal cords mobile with good contact b/l.

## 2024-09-10 NOTE — PHYSICAL EXAM
[Normal] : mucosa is normal [Midline] : trachea located in midline position [de-identified] : exudate b/l

## 2025-01-16 NOTE — H&P PST ADULT - NSANTHBMIRD_ENT_A_CORE
Is the patient currently in the state of [place]? YES    Visit mode: VIDEO    If the visit is dropped, the patient can be reconnected by:VIDEO VISIT: Send to e-mail at: [email or web address]    Will anyone else be joining the visit? NO  (If patient encounters technical issues they should call [phone]    Are changes needed to the allergy or medication list? No    Are refills needed on medications prescribed by this physician? NO    Rooming Documentation:  Questionnaire(s) completed    Reason for visit: Video Visit (Follow Up )    Jennifer CARDOSO     
No